# Patient Record
Sex: MALE | Race: OTHER | Employment: FULL TIME | URBAN - METROPOLITAN AREA
[De-identification: names, ages, dates, MRNs, and addresses within clinical notes are randomized per-mention and may not be internally consistent; named-entity substitution may affect disease eponyms.]

---

## 2021-01-04 ENCOUNTER — HOSPITAL ENCOUNTER (OUTPATIENT)
Age: 56
Discharge: HOME | End: 2021-01-04
Payer: COMMERCIAL

## 2021-01-04 VITALS
OXYGEN SATURATION: 99 % | WEIGHT: 240 LBS | RESPIRATION RATE: 16 BRPM | TEMPERATURE: 99 F | DIASTOLIC BLOOD PRESSURE: 104 MMHG | HEART RATE: 99 BPM | BODY MASS INDEX: 35.55 KG/M2 | SYSTOLIC BLOOD PRESSURE: 176 MMHG | HEIGHT: 69 IN

## 2021-01-04 DIAGNOSIS — L02.91 ABSCESS: Primary | ICD-10-CM

## 2021-01-04 DIAGNOSIS — L72.3 SEBACEOUS CYST: ICD-10-CM

## 2021-01-04 PROCEDURE — 87077 CULTURE AEROBIC IDENTIFY: CPT | Performed by: NURSE PRACTITIONER

## 2021-01-04 PROCEDURE — 10061 I&D ABSCESS COMP/MULTIPLE: CPT | Performed by: NURSE PRACTITIONER

## 2021-01-04 PROCEDURE — 99203 OFFICE O/P NEW LOW 30 MIN: CPT | Performed by: NURSE PRACTITIONER

## 2021-01-04 PROCEDURE — 87205 SMEAR GRAM STAIN: CPT | Performed by: NURSE PRACTITIONER

## 2021-01-04 PROCEDURE — 87070 CULTURE OTHR SPECIMN AEROBIC: CPT | Performed by: NURSE PRACTITIONER

## 2021-01-04 RX ORDER — SULFAMETHOXAZOLE AND TRIMETHOPRIM 800; 160 MG/1; MG/1
1 TABLET ORAL 2 TIMES DAILY
Qty: 14 TABLET | Refills: 0 | Status: SHIPPED | OUTPATIENT
Start: 2021-01-04 | End: 2021-01-11

## 2021-01-04 NOTE — ED INITIAL ASSESSMENT (HPI)
Pt presents with an abscess on mid back, pt states he has had the lump for a while but started to hurt x 1 day.

## 2021-01-05 NOTE — ED PROVIDER NOTES
Patient Seen in: [facility]    History   Patient presents with:  Derm Problem    Stated Complaint: BACK ABSCESS    HPI  Patient complains of skin infection for  6 days. Located on the back. Pt with a history of the same.   Had surgery on an shoulder blade. There is no erythema surrounding the abscess. There is no open wounds, no drainage from either abscess.   PSYCH: calm, cooperative,          ED Course     Labs Reviewed   AEROBIC BACTERIAL CULTURE       MDM             Procedures:    Absce

## 2021-01-06 ENCOUNTER — HOSPITAL ENCOUNTER (OUTPATIENT)
Age: 56
Discharge: HOME | End: 2021-01-06
Payer: COMMERCIAL

## 2021-01-06 VITALS
SYSTOLIC BLOOD PRESSURE: 171 MMHG | HEIGHT: 69 IN | OXYGEN SATURATION: 98 % | DIASTOLIC BLOOD PRESSURE: 91 MMHG | BODY MASS INDEX: 35.55 KG/M2 | HEART RATE: 99 BPM | WEIGHT: 240 LBS | RESPIRATION RATE: 16 BRPM | TEMPERATURE: 99 F

## 2021-01-06 DIAGNOSIS — Z51.89 WOUND CHECK, ABSCESS: Primary | ICD-10-CM

## 2021-01-06 PROCEDURE — 99024 POSTOP FOLLOW-UP VISIT: CPT | Performed by: NURSE PRACTITIONER

## 2021-01-06 NOTE — ED PROVIDER NOTES
Patient Seen in: [facility]      History   Patient presents with:  Rash Skin Problem    Stated Complaint: WOUND CHECK    HPI/Subjective:   Pt presents for a wound check 2 days post I & D. Pt communicates compliance with oral antibiotic.  Luciano Bernstein Mental Status: He is alert and oriented to person, place, and time. Psychiatric:         Mood and Affect: Mood normal.               ED Course   Labs Reviewed - No data to display              MDM   Wounds x2 appears to be healing well.  No purulent dr

## 2021-01-06 NOTE — ED INITIAL ASSESSMENT (HPI)
Pt presents to the IC with c/o needing a wound check. Pt was seen on 1/4 for an abscess to his upper back. I&D was performed and packing placed. Wound continues to drain. No fevers. Pt has been taking the prescribed bactrim.